# Patient Record
Sex: MALE | Race: BLACK OR AFRICAN AMERICAN | NOT HISPANIC OR LATINO | Employment: FULL TIME | URBAN - METROPOLITAN AREA
[De-identification: names, ages, dates, MRNs, and addresses within clinical notes are randomized per-mention and may not be internally consistent; named-entity substitution may affect disease eponyms.]

---

## 2022-02-10 ENCOUNTER — HOSPITAL ENCOUNTER (EMERGENCY)
Facility: HOSPITAL | Age: 42
Discharge: HOME | End: 2022-02-10
Attending: EMERGENCY MEDICINE

## 2022-02-10 VITALS
WEIGHT: 224.44 LBS | HEIGHT: 69 IN | HEART RATE: 71 BPM | DIASTOLIC BLOOD PRESSURE: 95 MMHG | SYSTOLIC BLOOD PRESSURE: 141 MMHG | TEMPERATURE: 99 F | RESPIRATION RATE: 18 BRPM | OXYGEN SATURATION: 98 % | BODY MASS INDEX: 33.24 KG/M2

## 2022-02-10 DIAGNOSIS — S61.213A LACERATION OF LEFT MIDDLE FINGER WITHOUT FOREIGN BODY WITHOUT DAMAGE TO NAIL, INITIAL ENCOUNTER: Primary | ICD-10-CM

## 2022-02-10 PROCEDURE — 63600175 PHARM REV CODE 636 W HCPCS: Performed by: NURSE PRACTITIONER

## 2022-02-10 PROCEDURE — 99284 EMERGENCY DEPT VISIT MOD MDM: CPT | Mod: 25

## 2022-02-10 PROCEDURE — 25000003 PHARM REV CODE 250: Performed by: NURSE PRACTITIONER

## 2022-02-10 PROCEDURE — 90715 TDAP VACCINE 7 YRS/> IM: CPT | Performed by: NURSE PRACTITIONER

## 2022-02-10 PROCEDURE — 12002 RPR S/N/AX/GEN/TRNK2.6-7.5CM: CPT

## 2022-02-10 PROCEDURE — 90471 IMMUNIZATION ADMIN: CPT | Performed by: NURSE PRACTITIONER

## 2022-02-10 RX ORDER — BUPIVACAINE HYDROCHLORIDE 5 MG/ML
10 INJECTION, SOLUTION EPIDURAL; INTRACAUDAL
Status: COMPLETED | OUTPATIENT
Start: 2022-02-10 | End: 2022-02-10

## 2022-02-10 RX ORDER — DICLOFENAC SODIUM 50 MG/1
50 TABLET, DELAYED RELEASE ORAL 3 TIMES DAILY PRN
Qty: 15 TABLET | Refills: 0 | Status: SHIPPED | OUTPATIENT
Start: 2022-02-10

## 2022-02-10 RX ORDER — LIDOCAINE HYDROCHLORIDE 10 MG/ML
10 INJECTION, SOLUTION EPIDURAL; INFILTRATION; INTRACAUDAL; PERINEURAL
Status: COMPLETED | OUTPATIENT
Start: 2022-02-10 | End: 2022-02-10

## 2022-02-10 RX ORDER — MUPIROCIN 20 MG/G
OINTMENT TOPICAL 3 TIMES DAILY
Qty: 1 EACH | Refills: 0 | Status: SHIPPED | OUTPATIENT
Start: 2022-02-10 | End: 2022-02-17

## 2022-02-10 RX ADMIN — LIDOCAINE HYDROCHLORIDE 100 MG: 10 INJECTION, SOLUTION EPIDURAL; INFILTRATION; INTRACAUDAL at 08:02

## 2022-02-10 RX ADMIN — BUPIVACAINE HYDROCHLORIDE 50 MG: 5 INJECTION, SOLUTION EPIDURAL; INTRACAUDAL at 08:02

## 2022-02-10 RX ADMIN — TETANUS TOXOID, REDUCED DIPHTHERIA TOXOID AND ACELLULAR PERTUSSIS VACCINE, ADSORBED 0.5 ML: 5; 2.5; 8; 8; 2.5 SUSPENSION INTRAMUSCULAR at 08:02

## 2022-02-10 NOTE — Clinical Note
"Santo Rubalcava (Micah) was seen and treated in our emergency department on 2/10/2022.  He may return with limitations on 02/12/2022.  Left hand use     Sincerely,      Yasmani Stanton NP    "

## 2022-02-11 NOTE — ED PROVIDER NOTES
HISTORY     Chief Complaint   Patient presents with    Laceration     Pt presented to ED with laceration to middle lt finger. Pt reports he cut his finger at work with a utility blade. He was sent here by urgent care today.     Review of patient's allergies indicates:  No Known Allergies     HPI   The history is provided by the patient.   Laceration   The incident occurred several hours ago. Pain location: left middle finger  The laceration is 4 cm in size. The laceration mechanism was a a razor. The pain is at a severity of 4/10. The pain has been constant since onset. He reports no foreign bodies present. His tetanus status is out of date.        PCP: No primary care provider on file.     Past Medical History:  No past medical history on file.     Past Surgical History:  No past surgical history on file.     Family History:  No family history on file.     Social History:  Social History     Tobacco Use    Smoking status: Not on file    Smokeless tobacco: Not on file   Substance and Sexual Activity    Alcohol use: Not on file    Drug use: Not on file    Sexual activity: Not on file         ROS   Review of Systems   Constitutional: Negative for fever.   HENT: Negative for sore throat.    Respiratory: Negative for shortness of breath.    Cardiovascular: Negative for chest pain.   Gastrointestinal: Negative for nausea.   Genitourinary: Negative for dysuria.   Musculoskeletal: Negative for back pain.   Skin: Negative for rash.        Laceration to left middle finger    Neurological: Negative for weakness.   Hematological: Does not bruise/bleed easily.       PHYSICAL EXAM     Initial Vitals [02/10/22 1918]   BP Pulse Resp Temp SpO2   (!) 163/94 72 18 98.7 °F (37.1 °C) 97 %      MAP       --           Physical Exam    Constitutional: He appears well-developed and well-nourished. No distress.   HENT:   Head: Normocephalic and atraumatic.   Eyes: Conjunctivae are normal. Pupils are equal, round, and reactive to  light.   Neck: Neck supple.   Normal range of motion.  Cardiovascular: Normal rate, regular rhythm and normal heart sounds.   Pulmonary/Chest: Breath sounds normal.   Abdominal: Abdomen is soft. Bowel sounds are normal.   Musculoskeletal:         General: Normal range of motion.        Hands:       Cervical back: Normal range of motion and neck supple.     Neurological: He is alert and oriented to person, place, and time. No cranial nerve deficit.   Skin: Skin is warm and dry.   Psychiatric: He has a normal mood and affect.          ED COURSE   Lac Repair    Date/Time: 2/10/2022 10:58 PM  Performed by: Yasmani Stanton NP  Authorized by: Artemio Hall MD     Consent:     Consent given by:  Patient  Anesthesia:     Anesthesia method:  Nerve block    Block needle gauge:  27 G    Block anesthetic:  Bupivacaine 0.5% w/o epi and lidocaine 1% w/o epi    Block injection procedure:  Anatomic landmarks identified    Block outcome:  Anesthesia achieved  Laceration details:     Location:  Finger    Finger location:  L long finger    Length (cm):  4    Depth (mm):  3  Pre-procedure details:     Preparation:  Patient was prepped and draped in usual sterile fashion  Exploration:     Hemostasis achieved with:  Direct pressure and tourniquet    Imaging outcome: foreign body not noted      Wound exploration: wound explored through full range of motion and entire depth of wound visualized      Wound extent: no tendon damage noted    Treatment:     Area cleansed with:  Chlorhexidine    Amount of cleaning:  Extensive    Irrigation solution:  Sterile saline    Irrigation method:  Syringe    Debridement:  None    Undermining:  None  Skin repair:     Repair method:  Sutures    Suture size:  4-0    Suture material:  Nylon    Suture technique:  Simple interrupted    Number of sutures:  4  Approximation:     Approximation:  Close  Repair type:     Repair type:  Simple  Post-procedure details:     Dressing:  Splint for protection,  "non-adherent dressing and sterile dressing    Procedure completion:  Tolerated well, no immediate complications      ED ONGOING VITALS:  Vitals:    02/10/22 1918 02/10/22 1945 02/10/22 2119   BP: (!) 163/94 (!) 154/86 (!) 141/95   Pulse: 72 68 71   Resp: 18 18 18   Temp: 98.7 °F (37.1 °C) 98.5 °F (36.9 °C) 98.5 °F (36.9 °C)   TempSrc: Oral Oral Oral   SpO2: 97% 98% 98%   Weight: 101.8 kg (224 lb 6.9 oz)     Height: 5' 9" (1.753 m)           ABNORMAL LAB VALUES:  Labs Reviewed - No data to display      ALL LAB VALUES:        RADIOLOGY STUDIES:  Imaging Results          X-Ray Finger 2 or More Views Left (Final result)  Result time 02/10/22 20:14:05    Final result by Natan Mcqueen MD (02/10/22 20:14:05)                 Impression:      As above.      Electronically signed by: Natan Mcqueen  Date:    02/10/2022  Time:    20:14             Narrative:    EXAMINATION:  XR FINGER 2 OR MORE VIEWS LEFT    CLINICAL HISTORY:  finger laceration;    TECHNIQUE:  Three views fingers    COMPARISON:  None    FINDINGS:  No fracture.  No traumatic malalignment.  No osseous destructive process.    Soft tissue injury about the DIP joint along the dorsal margin of the 3rd digit.  No radiopaque foreign body.                                          The above vital signs and test results have been reviewed by the emergency provider.     ED Medications:  Discharge Medication List as of 2/10/2022  9:03 PM      START taking these medications    Details   diclofenac (VOLTAREN) 50 MG EC tablet Take 1 tablet (50 mg total) by mouth 3 (three) times daily as needed., Starting Thu 2/10/2022, Print      mupirocin (BACTROBAN) 2 % ointment Apply topically 3 (three) times daily. for 7 days, Starting Thu 2/10/2022, Until Thu 2/17/2022, Print           Discharge Medications:  Discharge Medication List as of 2/10/2022  9:03 PM      START taking these medications    Details   diclofenac (VOLTAREN) 50 MG EC tablet Take 1 tablet (50 mg total) by mouth 3 " (three) times daily as needed., Starting Thu 2/10/2022, Print      mupirocin (BACTROBAN) 2 % ointment Apply topically 3 (three) times daily. for 7 days, Starting Thu 2/10/2022, Until Thu 2/17/2022, Print             Follow-up Information     Schedule an appointment as soon as possible for a visit  with PCP.           [facility] - Emergency Dept..    Specialty: Emergency Medicine  Contact information:  [address] [facility] [address] [phone]                      I discussed with patient and/or family/caretaker that evaluation in the ED does not suggest any emergent or life threatening medical conditions requiring immediate intervention beyond what was provided in the ED, and I believe patient is safe for discharge. Regardless, an unremarkable evaluation in the ED does not preclude the development or presence of a serious or life threatening condition. As such, patient was instructed to return immediately for any worsening or change in current symptoms.    Pre-hypertension/Hypertension: The pt has been informed that they may have pre-hypertension or hypertension based on a blood pressure reading in the ED. I recommend that the pt call the PCP listed on their discharge instructions or a physician of their choice this week to arrange f/u for further evaluation of possible pre-hypertension or hypertension.       Regarding LACERATION CARE, patient was instructed to wash hands with soap and warm water before and after caring for wound; keep the wound dry for the first 24 to 48 hours and then gently clean the wound once or twice a day with cool water using soap to clean around the wound; avoid using alcohol or hydrogen peroxide to clean wound unless directed to; and use bandages to keep wound clean and protected and to prevent swelling.  Advised patient to contact primary healthcare provider if wound splits open; becomes extremely painful; appears to not be healing; has a foreign object  present; develop a purulent discharge; or note the skin around the wound becoming numb, edematous, or erythematous.  Patient instructed to follow up with primary care provider for wound re-check or closure removal in 8-10 days.      MEDICAL DECISION MAKING                 CLINICAL IMPRESSION       ICD-10-CM ICD-9-CM   1. Laceration of left middle finger without foreign body without damage to nail, initial encounter  S61.213A 883.0       Disposition:   Disposition: Discharged  Condition: Stable         Yasmani Stanton NP  02/11/22 0104

## 2022-02-11 NOTE — ED NOTES
Patient identifiers verified and correct for Santo Rubalcava.    LOC: The patient is awake, alert and aware of environment with an appropriate affect, the patient is oriented x 3 and speaking appropriately.  APPEARANCE: Patient resting comfortably and in no acute distress, patient is clean and well groomed, patient's clothing is properly fastened.  SKIN: The skin is warm and dry, color consistent with ethnicity, patient has normal skin turgor and moist mucus membranes, no breakdown or bruising noted. Pt has skin laceration to left middle finger, pt reports cutting in on a utility blade at work.  MUSCULOSKELETAL: Patient moving all extremities spontaneously.  RESPIRATORY: Airway is open and patent, respirations are spontaneous.  CARDIAC: Patient has a normal rate, no periphreal edema noted, capillary refill < 3 seconds.  ABDOMEN: Soft and non tender to palpation.

## 2025-07-03 ENCOUNTER — HOSPITAL ENCOUNTER (EMERGENCY)
Facility: HOSPITAL | Age: 45
Discharge: HOME | End: 2025-07-03
Attending: EMERGENCY MEDICINE

## 2025-07-03 VITALS
OXYGEN SATURATION: 100 % | RESPIRATION RATE: 20 BRPM | TEMPERATURE: 99 F | DIASTOLIC BLOOD PRESSURE: 99 MMHG | HEART RATE: 67 BPM | SYSTOLIC BLOOD PRESSURE: 161 MMHG

## 2025-07-03 DIAGNOSIS — M54.6 ACUTE BILATERAL THORACIC BACK PAIN: Primary | ICD-10-CM

## 2025-07-03 DIAGNOSIS — Z91.148 NON COMPLIANCE W MEDICATION REGIMEN: ICD-10-CM

## 2025-07-03 DIAGNOSIS — R07.9 CHEST PAIN: ICD-10-CM

## 2025-07-03 DIAGNOSIS — R94.31 EKG ABNORMALITIES: ICD-10-CM

## 2025-07-03 DIAGNOSIS — I10 HYPERTENSION, UNSPECIFIED TYPE: ICD-10-CM

## 2025-07-03 LAB
ABSOLUTE EOSINOPHIL (OHS): 0.04 K/UL
ABSOLUTE MONOCYTE (OHS): 0.91 K/UL (ref 0.3–1)
ABSOLUTE NEUTROPHIL COUNT (OHS): 5.51 K/UL (ref 1.8–7.7)
ALBUMIN SERPL BCP-MCNC: 4 G/DL (ref 3.5–5.2)
ALP SERPL-CCNC: 54 UNIT/L (ref 40–150)
ALT SERPL W/O P-5'-P-CCNC: 22 UNIT/L (ref 10–44)
ANION GAP (OHS): 9 MMOL/L (ref 8–16)
AST SERPL-CCNC: 22 UNIT/L (ref 11–45)
BASOPHILS # BLD AUTO: 0.02 K/UL
BASOPHILS NFR BLD AUTO: 0.3 %
BILIRUB SERPL-MCNC: 0.9 MG/DL (ref 0.1–1)
BNP SERPL-MCNC: 45 PG/ML (ref 0–99)
BUN SERPL-MCNC: 10 MG/DL (ref 6–20)
CALCIUM SERPL-MCNC: 8.8 MG/DL (ref 8.7–10.5)
CHLORIDE SERPL-SCNC: 106 MMOL/L (ref 95–110)
CO2 SERPL-SCNC: 24 MMOL/L (ref 23–29)
CREAT SERPL-MCNC: 1.1 MG/DL (ref 0.5–1.4)
ERYTHROCYTE [DISTWIDTH] IN BLOOD BY AUTOMATED COUNT: 13.2 % (ref 11.5–14.5)
GFR SERPLBLD CREATININE-BSD FMLA CKD-EPI: >60 ML/MIN/1.73/M2
GLUCOSE SERPL-MCNC: 96 MG/DL (ref 70–110)
HCT VFR BLD AUTO: 39 % (ref 40–54)
HGB BLD-MCNC: 12.9 GM/DL (ref 14–18)
IMM GRANULOCYTES # BLD AUTO: 0.03 K/UL (ref 0–0.04)
IMM GRANULOCYTES NFR BLD AUTO: 0.4 % (ref 0–0.5)
LYMPHOCYTES # BLD AUTO: 1.38 K/UL (ref 1–4.8)
MCH RBC QN AUTO: 29.7 PG (ref 27–31)
MCHC RBC AUTO-ENTMCNC: 33.1 G/DL (ref 32–36)
MCV RBC AUTO: 90 FL (ref 82–98)
NUCLEATED RBC (/100WBC) (OHS): 0 /100 WBC
OHS QRS DURATION: 84 MS
OHS QTC CALCULATION: 414 MS
PLATELET # BLD AUTO: 279 K/UL (ref 150–450)
PMV BLD AUTO: 10.4 FL (ref 9.2–12.9)
POTASSIUM SERPL-SCNC: 3.6 MMOL/L (ref 3.5–5.1)
PROT SERPL-MCNC: 8 GM/DL (ref 6–8.4)
RBC # BLD AUTO: 4.34 M/UL (ref 4.6–6.2)
RELATIVE EOSINOPHIL (OHS): 0.5 %
RELATIVE LYMPHOCYTE (OHS): 17.5 % (ref 18–48)
RELATIVE MONOCYTE (OHS): 11.5 % (ref 4–15)
RELATIVE NEUTROPHIL (OHS): 69.8 % (ref 38–73)
SODIUM SERPL-SCNC: 139 MMOL/L (ref 136–145)
TROPONIN I SERPL DL<=0.01 NG/ML-MCNC: <0.006 NG/ML
WBC # BLD AUTO: 7.89 K/UL (ref 3.9–12.7)

## 2025-07-03 PROCEDURE — 84484 ASSAY OF TROPONIN QUANT: CPT | Performed by: EMERGENCY MEDICINE

## 2025-07-03 PROCEDURE — 25000003 PHARM REV CODE 250: Performed by: EMERGENCY MEDICINE

## 2025-07-03 PROCEDURE — 83880 ASSAY OF NATRIURETIC PEPTIDE: CPT | Performed by: EMERGENCY MEDICINE

## 2025-07-03 PROCEDURE — 85025 COMPLETE CBC W/AUTO DIFF WBC: CPT | Performed by: EMERGENCY MEDICINE

## 2025-07-03 PROCEDURE — 93005 ELECTROCARDIOGRAM TRACING: CPT

## 2025-07-03 PROCEDURE — 93010 ELECTROCARDIOGRAM REPORT: CPT | Mod: ,,, | Performed by: INTERNAL MEDICINE

## 2025-07-03 PROCEDURE — 99285 EMERGENCY DEPT VISIT HI MDM: CPT | Mod: 25

## 2025-07-03 PROCEDURE — 82040 ASSAY OF SERUM ALBUMIN: CPT | Performed by: EMERGENCY MEDICINE

## 2025-07-03 PROCEDURE — 63600175 PHARM REV CODE 636 W HCPCS: Performed by: EMERGENCY MEDICINE

## 2025-07-03 PROCEDURE — 96374 THER/PROPH/DIAG INJ IV PUSH: CPT

## 2025-07-03 PROCEDURE — 96375 TX/PRO/DX INJ NEW DRUG ADDON: CPT

## 2025-07-03 RX ORDER — ASPIRIN 325 MG
325 TABLET ORAL
Status: COMPLETED | OUTPATIENT
Start: 2025-07-03 | End: 2025-07-03

## 2025-07-03 RX ORDER — ONDANSETRON HYDROCHLORIDE 2 MG/ML
4 INJECTION, SOLUTION INTRAVENOUS
Status: COMPLETED | OUTPATIENT
Start: 2025-07-03 | End: 2025-07-03

## 2025-07-03 RX ORDER — AMLODIPINE BESYLATE 10 MG/1
10 TABLET ORAL DAILY
Qty: 30 TABLET | Refills: 0 | Status: SHIPPED | OUTPATIENT
Start: 2025-07-03 | End: 2025-07-10 | Stop reason: SDUPTHER

## 2025-07-03 RX ORDER — AMLODIPINE BESYLATE 5 MG/1
10 TABLET ORAL
Status: COMPLETED | OUTPATIENT
Start: 2025-07-03 | End: 2025-07-03

## 2025-07-03 RX ORDER — KETOROLAC TROMETHAMINE 10 MG/1
10 TABLET, FILM COATED ORAL EVERY 8 HOURS PRN
Qty: 9 TABLET | Refills: 0 | Status: SHIPPED | OUTPATIENT
Start: 2025-07-03

## 2025-07-03 RX ORDER — HYDROCHLOROTHIAZIDE 25 MG/1
25 TABLET ORAL DAILY
Qty: 30 TABLET | Refills: 0 | Status: SHIPPED | OUTPATIENT
Start: 2025-07-03 | End: 2025-07-10 | Stop reason: SDUPTHER

## 2025-07-03 RX ORDER — KETOROLAC TROMETHAMINE 30 MG/ML
15 INJECTION, SOLUTION INTRAMUSCULAR; INTRAVENOUS
Status: COMPLETED | OUTPATIENT
Start: 2025-07-03 | End: 2025-07-03

## 2025-07-03 RX ORDER — CYCLOBENZAPRINE HCL 10 MG
5 TABLET ORAL 3 TIMES DAILY PRN
Qty: 15 TABLET | Refills: 0 | Status: SHIPPED | OUTPATIENT
Start: 2025-07-03 | End: 2025-07-13

## 2025-07-03 RX ORDER — NITROGLYCERIN 20 MG/G
1 OINTMENT TOPICAL
Status: COMPLETED | OUTPATIENT
Start: 2025-07-03 | End: 2025-07-03

## 2025-07-03 RX ADMIN — AMLODIPINE BESYLATE 10 MG: 5 TABLET ORAL at 07:07

## 2025-07-03 RX ADMIN — NITROGLYCERIN 1 INCH: 20 OINTMENT TOPICAL at 07:07

## 2025-07-03 RX ADMIN — KETOROLAC TROMETHAMINE 15 MG: 30 INJECTION, SOLUTION INTRAMUSCULAR at 07:07

## 2025-07-03 RX ADMIN — ASPIRIN 325 MG: 325 TABLET ORAL at 07:07

## 2025-07-03 RX ADMIN — ONDANSETRON 4 MG: 2 INJECTION INTRAMUSCULAR; INTRAVENOUS at 07:07

## 2025-07-03 NOTE — ED PROVIDER NOTES
"SCRIBE #1 NOTE: I, Faridakinjal Sagastume, am scribing for, and in the presence of, Thien Barnett Jr., MD. I have scribed the entire note.       History     Chief Complaint   Patient presents with    Back Pain     Pt. Reports mid back pain x 2 days and now he is having pain across his chest when he inhales. Pt. Reports that he may have over exerted himself working out. Pt. Reports that he is supposed to take BP meds. But he has been out for awhile.      Review of patient's allergies indicates:  No Known Allergies      History of Present Illness     HPI    7/3/2025, 7:26 AM  History obtained from the patient and medical records      History of Present Illness: Santo Rubalcava is a 44 y.o. male patient with no previous PMHx reported who presents to the Emergency Department for evaluation of back pain now radiating to his chest which began 2 days ago. He notes that his chest "feels tense" when he takes a breath. Symptoms are constant and moderate in severity.  They are worse with movement and with lifting.  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  The patient is has a history of hypertension in his well in his has been noncompliant with his medications having run out several months ago.  That has denies any chest pain or prior cardiac history.  That has the pain starts in his back and radiates around both sides to the lateral aspect of the chest wall.  That has no left chest pain or radiation to the arms.  That has no nausea vomiting or diaphoresis.  Denies any cough, wheezing, rhinorrhea, or sore throat. No prior Tx specified. Pt notes he works a strenuous job and must lift heavy objects frequently. No further complaints or concerns at this time.       Arrival mode: Personal Transportation    PCP: Zoey, Primary Doctor        Past Medical History:  Past Medical History:   Diagnosis Date    Hypertension        Past Surgical History:  History reviewed. No pertinent surgical history.  "     Family History:  No family history on file.    Social History:  Social History     Tobacco Use    Smoking status: Never    Smokeless tobacco: Never   Substance and Sexual Activity    Alcohol use: Never    Drug use: Yes     Types: Marijuana    Sexual activity: Not on file        Review of Systems     Review of Systems   Constitutional:  Negative for fever.   HENT:  Negative for rhinorrhea and sore throat.    Respiratory:  Negative for cough, shortness of breath and wheezing.    Cardiovascular:  Positive for chest pain (tense upon inspiration).   Gastrointestinal:  Negative for nausea.   Genitourinary:  Negative for dysuria.   Musculoskeletal:  Positive for back pain (radiating to chest).   Skin:  Negative for rash.   Neurological:  Negative for weakness.   Hematological:  Does not bruise/bleed easily.   All other systems reviewed and are negative.     Physical Exam     Initial Vitals [07/03/25 0657]   BP Pulse Resp Temp SpO2   (!) 179/111 77 16 98.7 °F (37.1 °C) 96 %      MAP       --          Physical Exam  Nursing Notes and Vital Signs Reviewed.  Constitutional: Patient is in no acute distress. Well-developed and well-nourished.  Head: Atraumatic. Normocephalic.  Eyes:  EOM intact.  No scleral icterus.  ENT: Mucous membranes are moist.  Nares clear   Neck:  Full ROM. No JVD.  Cardiovascular: Regular rate. Regular rhythm No murmurs, rubs, or gallops. Distal pulses are 2+ and symmetric  Pulmonary/Chest: No respiratory distress. Clear to auscultation bilaterally. No wheezing or rales.  Equal chest wall rise bilaterally  Abdominal: Soft and non-distended.  There is no tenderness.  No rebound, guarding, or rigidity. Good bowel sounds.  Genitourinary: No CVA tenderness.  No suprapubic tenderness  Musculoskeletal: Moves all extremities. No obvious deformities.  5 x 5 strength in all extremities   Skin: Warm and dry.  That has no reproducible chest wall tenderness.  That has no point C/T/L/S tenderness.  Normal spinal  curvature.  That has no calf tenderness or swelling noted  Neurological:  Alert, awake, and appropriate.  Normal speech.  No acute focal neurological deficits are appreciated.  Two through 12 intact bilaterally.  Psychiatric: Normal affect. Good eye contact. Appropriate in content.     ED Course   Procedures  ED Vital Signs:  Vitals:    07/03/25 0657 07/03/25 0742 07/03/25 0754 07/03/25 0800   BP: (!) 179/111 (!) 178/109  (!) 162/99   Pulse: 77  77 73   Resp: 16   20   Temp: 98.7 °F (37.1 °C)      TempSrc: Oral      SpO2: 96%   100%       Abnormal Lab Results:  Labs Reviewed   CBC WITH DIFFERENTIAL - Abnormal       Result Value    WBC 7.89      RBC 4.34 (*)     HGB 12.9 (*)     HCT 39.0 (*)     MCV 90      MCH 29.7      MCHC 33.1      RDW 13.2      Platelet Count 279      MPV 10.4      Nucleated RBC 0      Neut % 69.8      Lymph % 17.5 (*)     Mono % 11.5      Eos % 0.5      Basophil % 0.3      Imm Grans % 0.4      Neut # 5.51      Lymph # 1.38      Mono # 0.91      Eos # 0.04      Baso # 0.02      Imm Grans # 0.03     COMPREHENSIVE METABOLIC PANEL - Normal    Sodium 139      Potassium 3.6      Chloride 106      CO2 24      Glucose 96      BUN 10      Creatinine 1.1      Calcium 8.8      Protein Total 8.0      Albumin 4.0      Bilirubin Total 0.9      ALP 54      AST 22      ALT 22      Anion Gap 9      eGFR >60     TROPONIN I - Normal    Troponin-I <0.006     B-TYPE NATRIURETIC PEPTIDE - Normal    BNP 45     CBC W/ AUTO DIFFERENTIAL    Narrative:     The following orders were created for panel order CBC auto differential.  Procedure                               Abnormality         Status                     ---------                               -----------         ------                     CBC with Differential[[id number]]        Abnormal            Final result                 Please view results for these tests on the individual orders.        All Lab Results:  Results for orders placed or performed during the  hospital encounter of 07/03/25   EKG 12-lead    Collection Time: 07/03/25  7:02 AM   Result Value Ref Range    QRS Duration 84 ms    [facility] QTC Calculation 414 ms   Comprehensive metabolic panel    Collection Time: 07/03/25  7:40 AM   Result Value Ref Range    Sodium 139 136 - 145 mmol/L    Potassium 3.6 3.5 - 5.1 mmol/L    Chloride 106 95 - 110 mmol/L    CO2 24 23 - 29 mmol/L    Glucose 96 70 - 110 mg/dL    BUN 10 6 - 20 mg/dL    Creatinine 1.1 0.5 - 1.4 mg/dL    Calcium 8.8 8.7 - 10.5 mg/dL    Protein Total 8.0 6.0 - 8.4 gm/dL    Albumin 4.0 3.5 - 5.2 g/dL    Bilirubin Total 0.9 0.1 - 1.0 mg/dL    ALP 54 40 - 150 unit/L    AST 22 11 - 45 unit/L    ALT 22 10 - 44 unit/L    Anion Gap 9 8 - 16 mmol/L    eGFR >60 >60 mL/min/1.73/m2   Troponin I    Collection Time: 07/03/25  7:40 AM   Result Value Ref Range    Troponin-I <0.006 <=0.026 ng/mL   Brain natriuretic peptide    Collection Time: 07/03/25  7:40 AM   Result Value Ref Range    BNP 45 0 - 99 pg/mL   CBC with Differential    Collection Time: 07/03/25  7:40 AM   Result Value Ref Range    WBC 7.89 3.90 - 12.70 K/uL    RBC 4.34 (L) 4.60 - 6.20 M/uL    HGB 12.9 (L) 14.0 - 18.0 gm/dL    HCT 39.0 (L) 40.0 - 54.0 %    MCV 90 82 - 98 fL    MCH 29.7 27.0 - 31.0 pg    MCHC 33.1 32.0 - 36.0 g/dL    RDW 13.2 11.5 - 14.5 %    Platelet Count 279 150 - 450 K/uL    MPV 10.4 9.2 - 12.9 fL    Nucleated RBC 0 <=0 /100 WBC    Neut % 69.8 38 - 73 %    Lymph % 17.5 (L) 18 - 48 %    Mono % 11.5 4 - 15 %    Eos % 0.5 <=8 %    Basophil % 0.3 <=1.9 %    Imm Grans % 0.4 0.0 - 0.5 %    Neut # 5.51 1.8 - 7.7 K/uL    Lymph # 1.38 1 - 4.8 K/uL    Mono # 0.91 0.3 - 1 K/uL    Eos # 0.04 <=0.5 K/uL    Baso # 0.02 <=0.2 K/uL    Imm Grans # 0.03 0.00 - 0.04 K/uL       Imaging Results:  Imaging Results              X-Ray Chest AP Portable (Final result)  Result time 07/03/25 07:39:41      Final result by Mannie Galvan MD (07/03/25 07:39:41)                   Impression:      No acute  cardiopulmonary abnormality.      Electronically signed by: Mannie Galvan  Date:    07/03/2025  Time:    07:39               Narrative:    EXAMINATION:  XR CHEST AP PORTABLE    CLINICAL HISTORY:  chest pain;    TECHNIQUE:  Single frontal portable view of the chest was performed.    COMPARISON:  None    FINDINGS:  Cardiomediastinal silhouette is within normal limits.  Trachea is midline.  Pulmonary vasculature is unremarkable.  Lungs are clear.  No significant pleural effusion or pneumothorax.  No acute bony abnormality.                                       The EKG was ordered, reviewed, and independently interpreted by the ED provider.  Interpretation time: 07:02  Rate: 86 BPM  Rhythm: normal sinus rhythm  Interpretation: Minimal voltage criteria for LVH, may be normal variant (R in aVL). ST and T wave abnormality, consider inferolateral ischemia. No STEMI.             The Emergency Provider reviewed the vital signs and test results, which are outlined above.     ED Discussion     8:52 AM: Reassessed pt at this time. Discussed with patient and/or family/caretaker all pertinent ED information and results. Discussed pt dx and plan of tx. Gave the patient all f/u and return to the ED instructions. All questions and concerns were addressed at this time. Patient and/or family/caretaker expresses understanding of information and instructions, and is comfortable with plan to discharge. Pt is stable for discharge.     I discussed with patient and/or family/caretaker that evaluation in the ED does not suggest any emergent or life threatening medical conditions requiring immediate intervention beyond what was provided in the ED, and I believe patient is safe for discharge. Regardless, an unremarkable evaluation in the ED does not preclude the development or presence of a serious or life threatening condition. As such, I instructed that the patient is to return immediately for any worsening or change in current  symptoms.    ED Course as of 07/03/25 0902   u Jul 03, 2025   0835 Cardiac monitor interpretation  Independent interpretation  Indication: Back and chest pain  Normal sinus rhythm.  Rate 68.  No STEMI [RT]      ED Course User Index  [RT] Thien Barnett Jr., MD     Medical Decision Making  Differential diagnosis: Back pain, muscle strain, pneumonia, chest pain, ASCVD    The patient is evaluated with the history and physical examination.  That has EKGs shows some T-wave inversions in the inferior lateral leads.  That has no old EKGs to compare.  That has no STEMI.  The patient has had symptoms now for 2 days that has been constant with a an undetectable troponin.  FAUSTINA score is only a 2.  The patient has pain was well-controlled with the medications given in the ED in his workup was otherwise benign.  I did discuss with the patient all findings.  I will refer to Cardiology for outpatient evaluation EKG and the patient is aware of this.  I will restart his blood pressure medicine referred to primary care will treat with Toradol and Flexeril in the interim.  The patient is verbalizes agreement and understanding with all instructions and seems reliable.  He is stable safe for discharge in my opinion    Amount and/or Complexity of Data Reviewed  Labs: ordered. Decision-making details documented in ED Course.     Details: Troponin is undetectable.  CBC and CMP are benign.  Radiology: ordered. Decision-making details documented in ED Course.     Details: Chest x-ray clear  ECG/medicine tests: ordered and independent interpretation performed. Decision-making details documented in ED Course.     Details: Inferior lateral T-wave inversions.  No STEMI    Risk  OTC drugs.  Prescription drug management.  Decision regarding hospitalization.       Additional MDM:   Heart Score:    History:          Slightly suspicious.  ECG:             Nonspecific repolarisation disturbance  Age:               Less than 45 years  Risk  factors: 1-2 risk factors  Troponin:       Less than or equal to normal limit  Heart Score = 2                ED Medication(s):  Medications   aspirin tablet 325 mg (325 mg Oral Given 7/3/25 0742)   ondansetron injection 4 mg (4 mg Intravenous Given 7/3/25 0742)   ketorolac injection 15 mg (15 mg Intravenous Given 7/3/25 0741)   nitroGLYCERIN 2% TD oint ointment 1 inch (1 inch Topical (Top) Given 7/3/25 0742)   amLODIPine tablet 10 mg (10 mg Oral Given 7/3/25 0742)       New Prescriptions    AMLODIPINE (NORVASC) 10 MG TABLET    Take 1 tablet (10 mg total) by mouth once daily.    CYCLOBENZAPRINE (FLEXERIL) 10 MG TABLET    Take 0.5 tablets (5 mg total) by mouth 3 (three) times daily as needed.    HYDROCHLOROTHIAZIDE (HYDRODIURIL) 25 MG TABLET    Take 1 tablet (25 mg total) by mouth once daily.    KETOROLAC (TORADOL) 10 MG TABLET    Take 1 tablet (10 mg total) by mouth every 8 (eight) hours as needed.        Follow-up Information       Clinic, [facility].    Contact information:  [address]  [place] [address] [phone]                                 Scribe Attestation:   Scribe #1: I performed the above scribed service and the documentation accurately describes the services I performed. I attest to the accuracy of the note.     Attending:   Physician Attestation Statement for Scribe #1: I, Thien Barnett Jr., MD, personally performed the services described in this documentation, as scribed by Farida Sagastume, in my presence, and it is both accurate and complete.           Clinical Impression       ICD-10-CM ICD-9-CM   1. Acute bilateral thoracic back pain  M54.6 724.1   2. Chest pain  R07.9 786.50   3. Hypertension, unspecified type  I10 401.9   4. Non compliance w medication regimen  Z91.148 V15.81   5. EKG abnormalities  R94.31 794.31       Disposition:   Disposition: Discharged  Condition: Stable         Thien Barnett Jr., MD  07/03/25 0902

## 2025-07-03 NOTE — DISCHARGE INSTRUCTIONS
You have her back pain appears to be musculoskeletal in origin.  Use Toradol for pain and Flexeril as a muscle relaxer.  Incidental finding of an abnormal EKG was made.  I have referred you to Cardiology for further workup and evaluation.  That has no evidence of heart damage today.  Your blood pressure is elevated.  I have prescribed for your blood pressure medications.  Please establish care with Cardiology as well as primary care.  Open health clinic as accepting the patient is currently.  This is imperative that you take your medications and keep them refilled in order to prevent any further injury to your body.  Return as needed.  Do not take NSAIDs or other over-the-counter medications while taking Toradol as this may result in overdose

## 2025-07-10 ENCOUNTER — OFFICE VISIT (OUTPATIENT)
Dept: CARDIOLOGY | Facility: CLINIC | Age: 45
End: 2025-07-10

## 2025-07-10 VITALS
WEIGHT: 211.63 LBS | SYSTOLIC BLOOD PRESSURE: 120 MMHG | OXYGEN SATURATION: 100 % | HEART RATE: 60 BPM | BODY MASS INDEX: 31.34 KG/M2 | DIASTOLIC BLOOD PRESSURE: 90 MMHG | HEIGHT: 69 IN

## 2025-07-10 DIAGNOSIS — R94.31 EKG ABNORMALITIES: ICD-10-CM

## 2025-07-10 DIAGNOSIS — R07.9 CHEST PAIN, UNSPECIFIED TYPE: ICD-10-CM

## 2025-07-10 DIAGNOSIS — I10 PRIMARY HYPERTENSION: ICD-10-CM

## 2025-07-10 DIAGNOSIS — E66.9 OBESITY (BMI 30-39.9): Primary | ICD-10-CM

## 2025-07-10 PROCEDURE — 99999 PR PBB SHADOW E&M-EST. PATIENT-LVL III: CPT | Mod: PBBFAC,,, | Performed by: INTERNAL MEDICINE

## 2025-07-10 PROCEDURE — 99213 OFFICE O/P EST LOW 20 MIN: CPT | Mod: PBBFAC | Performed by: INTERNAL MEDICINE

## 2025-07-10 PROCEDURE — 99204 OFFICE O/P NEW MOD 45 MIN: CPT | Mod: S$PBB,,, | Performed by: INTERNAL MEDICINE

## 2025-07-10 RX ORDER — HYDROCHLOROTHIAZIDE 25 MG/1
25 TABLET ORAL DAILY
Qty: 90 TABLET | Refills: 1 | Status: SHIPPED | OUTPATIENT
Start: 2025-07-10 | End: 2026-07-10

## 2025-07-10 RX ORDER — ASPIRIN 81 MG/1
81 TABLET ORAL DAILY
Qty: 90 TABLET | Refills: 3 | Status: SHIPPED | OUTPATIENT
Start: 2025-07-10 | End: 2026-07-10

## 2025-07-10 RX ORDER — AMLODIPINE BESYLATE 10 MG/1
10 TABLET ORAL DAILY
Qty: 90 TABLET | Refills: 1 | Status: SHIPPED | OUTPATIENT
Start: 2025-07-10 | End: 2026-07-10

## 2025-07-10 NOTE — PROGRESS NOTES
"Subjective:   Patient ID:  Santo Rubalcava is a 44 y.o. male who presents for cardiac consult of No chief complaint on file.      Referral by: Other  [address] [place] [address] [place] [id number]     Reason for consult:       HPI  The patient came in today for cardiac consult of No chief complaint on file.    7/10/25  Santo Rubalcava is a 44 y.o. male pt with HTN, obesity presents for CVD eval.       7/3/2025, 7:26 AM ER eval  History obtained from the patient and medical records                  History of Present Illness: Santo Rubalcava is a 44 y.o. male patient with no previous PMHx reported who presents to the Emergency Department for evaluation of back pain now radiating to his chest which began 2 days ago. He notes that his chest "feels tense" when he takes a breath. Symptoms are constant and moderate in severity.  They are worse with movement and with lifting.  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  The patient is has a history of hypertension in his well in his has been noncompliant with his medications having run out several months ago.  That has denies any chest pain or prior cardiac history.  That has the pain starts in his back and radiates around both sides to the lateral aspect of the chest wall.  That has no left chest pain or radiation to the arms.  That has no nausea vomiting or diaphoresis.  Denies any cough, wheezing, rhinorrhea, or sore throat. No prior Tx specified. Pt notes he works a strenuous job and must lift heavy objects frequently. No further complaints or concerns at this time.     Pt had recent ER eval for CP  BP elevated 120/90. HR 60. BMI 31 - 211 lbs   Pt currently does not have insurance but will get it soon.     FH - CVD history but unsure details     Pt does glass work.     Normal sinus rhythm   Minimal voltage criteria for LVH, may be normal variant ( R in aVL )   ST and T wave abnormality, consider inferolateral ischemia "   Abnormal ECG   No previous ECGs available   Confirmed by Joss Whiteside ([id number]) on 7/3/2025 3:53:52 PM     No cardiac monitor results found for the past 12 months         Past Medical History:   Diagnosis Date    Hypertension        History reviewed. No pertinent surgical history.    Social History[1]    No family history on file.    Patient's Medications   New Prescriptions    ASPIRIN (ECOTRIN) 81 MG EC TABLET    Take 1 tablet (81 mg total) by mouth once daily.   Previous Medications    CYCLOBENZAPRINE (FLEXERIL) 10 MG TABLET    Take 0.5 tablets (5 mg total) by mouth 3 (three) times daily as needed.    DICLOFENAC (VOLTAREN) 50 MG EC TABLET    Take 1 tablet (50 mg total) by mouth 3 (three) times daily as needed.    KETOROLAC (TORADOL) 10 MG TABLET    Take 1 tablet (10 mg total) by mouth every 8 (eight) hours as needed.   Modified Medications    Modified Medication Previous Medication    AMLODIPINE (NORVASC) 10 MG TABLET amLODIPine (NORVASC) 10 MG tablet       Take 1 tablet (10 mg total) by mouth once daily.    Take 1 tablet (10 mg total) by mouth once daily.    HYDROCHLOROTHIAZIDE (HYDRODIURIL) 25 MG TABLET hydroCHLOROthiazide (HYDRODIURIL) 25 MG tablet       Take 1 tablet (25 mg total) by mouth once daily. Do not take if BP is below 110/70 or feeling dry    Take 1 tablet (25 mg total) by mouth once daily.   Discontinued Medications    No medications on file       Review of Systems   Constitutional: Negative.    HENT: Negative.     Eyes: Negative.    Respiratory: Negative.     Cardiovascular:  Positive for chest pain.   Gastrointestinal: Negative.    Genitourinary: Negative.    Musculoskeletal: Negative.    Skin: Negative.    Neurological: Negative.    Endo/Heme/Allergies: Negative.    Psychiatric/Behavioral: Negative.     All 12 systems otherwise negative.      Wt Readings from Last 3 Encounters:   07/10/25 96 kg (211 lb 10.3 oz)   02/10/22 101.8 kg (224 lb 6.9 oz)     Temp Readings from Last 3 Encounters:  "  07/03/25 98.7 °F (37.1 °C) (Oral)   02/10/22 98.5 °F (36.9 °C) (Oral)     BP Readings from Last 3 Encounters:   07/10/25 (!) 120/90   07/03/25 (!) 161/99   02/10/22 (!) 141/95     Pulse Readings from Last 3 Encounters:   07/10/25 60   07/03/25 67   02/10/22 71       BP (!) 120/90 (BP Location: Right arm, Patient Position: Sitting)   Pulse 60   Ht 5' 9" (1.753 m)   Wt 96 kg (211 lb 10.3 oz)   SpO2 100%   BMI 31.25 kg/m²     Objective:   Physical Exam  Vitals and nursing note reviewed.   Constitutional:       General: He is not in acute distress.     Appearance: He is well-developed. He is obese. He is not diaphoretic.   HENT:      Head: Normocephalic and atraumatic.      Nose: Nose normal.   Eyes:      General: No scleral icterus.     Conjunctiva/sclera: Conjunctivae normal.   Neck:      Thyroid: No thyromegaly.      Vascular: No JVD.   Cardiovascular:      Rate and Rhythm: Normal rate and regular rhythm.      Heart sounds: S1 normal and S2 normal. No murmur heard.     No friction rub. No gallop. No S3 or S4 sounds.   Pulmonary:      Effort: Pulmonary effort is normal. No respiratory distress.      Breath sounds: Normal breath sounds. No stridor. No wheezing or rales.   Chest:      Chest wall: No tenderness.   Abdominal:      General: Bowel sounds are normal. There is no distension.      Palpations: Abdomen is soft. There is no mass.      Tenderness: There is no abdominal tenderness. There is no rebound.   Genitourinary:     Comments: Deferred  Musculoskeletal:         General: No tenderness or deformity. Normal range of motion.      Cervical back: Normal range of motion and neck supple.   Lymphadenopathy:      Cervical: No cervical adenopathy.   Skin:     General: Skin is warm and dry.      Coloration: Skin is not pale.      Findings: No erythema or rash.   Neurological:      Mental Status: He is alert and oriented to person, place, and time.      Motor: No abnormal muscle tone.      Coordination: " Coordination normal.   Psychiatric:         Behavior: Behavior normal.         Thought Content: Thought content normal.         Judgment: Judgment normal.         Lab Results   Component Value Date     07/03/2025    K 3.6 07/03/2025     07/03/2025    CO2 24 07/03/2025    BUN 10 07/03/2025    CREATININE 1.1 07/03/2025    GLU 96 07/03/2025    AST 22 07/03/2025    ALT 22 07/03/2025    ALBUMIN 4.0 07/03/2025    PROT 8.0 07/03/2025    BILITOT 0.9 07/03/2025    WBC 7.89 07/03/2025    HGB 12.9 (L) 07/03/2025    HCT 39.0 (L) 07/03/2025    MCV 90 07/03/2025     07/03/2025    BNP 45 07/03/2025         BNP (pg/mL)   Date Value   07/03/2025 45          Assessment:      1. Obesity (BMI 30-39.9)    2. Chest pain, unspecified type    3. EKG abnormalities    4. Primary hypertension        Plan:     Chest pain, abnormal ECG  - order ECG stress test and ECHO   - will discuss further tx as needed     2. HTN - elevated   - titrate meds  - cont norvasc and HCTZ    3. Obesity  - cont weight loss     Visit today included increased complexity associated with the care of the episodic problem chest pain addressed and managing the longitudinal care of the patient due to the serious and/or complex managed problem(s) .      Thank you for allowing me to participate in this patient's care. Please do not hesitate to contact me with any questions or concerns. Consult note has been forwarded to the referral physician.          [1]   Social History  Tobacco Use    Smoking status: Never    Smokeless tobacco: Never   Substance Use Topics    Alcohol use: Never    Drug use: Yes     Types: Marijuana